# Patient Record
Sex: MALE
[De-identification: names, ages, dates, MRNs, and addresses within clinical notes are randomized per-mention and may not be internally consistent; named-entity substitution may affect disease eponyms.]

---

## 2019-02-25 ENCOUNTER — HOSPITAL ENCOUNTER (EMERGENCY)
Dept: HOSPITAL 5 - ED | Age: 39
Discharge: HOME | End: 2019-02-25
Payer: MEDICARE

## 2019-02-25 VITALS — SYSTOLIC BLOOD PRESSURE: 155 MMHG | DIASTOLIC BLOOD PRESSURE: 100 MMHG

## 2019-02-25 DIAGNOSIS — I50.9: ICD-10-CM

## 2019-02-25 DIAGNOSIS — I11.0: ICD-10-CM

## 2019-02-25 DIAGNOSIS — J20.8: Primary | ICD-10-CM

## 2019-02-25 DIAGNOSIS — R51: ICD-10-CM

## 2019-02-25 PROCEDURE — 96372 THER/PROPH/DIAG INJ SC/IM: CPT

## 2019-02-25 PROCEDURE — 99283 EMERGENCY DEPT VISIT LOW MDM: CPT

## 2019-02-25 PROCEDURE — 71046 X-RAY EXAM CHEST 2 VIEWS: CPT

## 2019-02-25 NOTE — XRAY REPORT
FINAL REPORT



EXAM:  XR CHEST ROUTINE 2V



HISTORY:  cough 



TECHNIQUE:  PA and lateral views of the chest



PRIORS:  None.



FINDINGS:  

Lines, tubes, and devices: N/A



Lungs and pleura: Trachea is normal in position.  Lungs are clear of infiltrate, pleural effusion, va
scular congestion, or pneumothorax. 



Cardiomediastinal silhouette: Cardiac and mediastinal silhouettes are unremarkable.



Other: Bony structures are intact.



IMPRESSION:  

No acute cardiopulmonary process seen.

## 2019-02-25 NOTE — EMERGENCY DEPARTMENT REPORT
Blank Doc





- Documentation


Documentation: 





This is a 38-year-old male that presents with URI symptoms.





This initial assessment diagnostic orders/clinical plan/treatment(s) is/are 

subject to change based on patient's health status, clinical progression and re-

assessment by fellow clinical providers in the ED.  Further treatment and workup

at subsequent clinical providers discretion.  Patient/guardians urged not to 

elope from ED s their condition may be serious if not clinically assessed and 

managed.  Initial orders include:


1-Patient sent to [facility] for further evaluation and treatment


2- CXR

## 2019-02-25 NOTE — EMERGENCY DEPARTMENT REPORT
Minor Respiratory





- HPI


Chief Complaint: Upper Respiratory Infection


Stated Complaint: POSS RESPIRATORY INFECTION


Time Seen by Provider: 19 14:11


Duration: 2 Days


Pain Location: Other (headache headache 2 days)


Severity: mild (2/10)


Minor Respiratory: Yes Rhinorrhea (nasal congestion that started over a week 

ago), Yes Able to Tolerate Fluids, Yes Cough (congested cough and chest), No 

Sore Throat, No Ear Pain, No Sick Contacts, No Hemoptysis, No Chest Pain, No 

Shortness of Breath, No Fever (report chills)


Other History: This is a 38-year-old male here report that he started having 

headache and feels congested in his chest with coughing and 2 days but he said 

prior to that he had nasal congestion and runny nose for over a week now.  His 

headache is 2/10 feels like pressure located to his forehead.  Over-the-counter 

cough and cold medication is not helping.  He did not take anything for pain.  

Patient have multiple comorbidities to include congestive heart failure, 

hypertension, renal disease and he is on dialysis.  Blood pressure is 155/100.  

He said he to take blood pressure medication but he had not taken them today.





ED Review of Systems


ROS: 


Stated complaint: POSS RESPIRATORY INFECTION


Other details as noted in HPI





Constitutional: chills.  denies: fever


ENT: congestion.  denies: throat pain


Respiratory: cough, wheezing.  denies: shortness of breath, SOB with exertion, 

SOB at rest, stridor


Cardiovascular: denies: chest pain, palpitations, dyspnea on exertion, edema, 

syncope


Gastrointestinal: denies: abdominal pain, nausea, vomiting


Musculoskeletal: denies: back pain, joint swelling, arthralgia, myalgia


Neurological: headache.  denies: numbness, paresthesias, abnormal gait, vertigo





ED Past Medical Hx





- Past Medical History


Previous Medical History?: Yes


Hx Hypertension: Yes (Took Lopressor @ 0530)


Hx Congestive Heart Failure: Yes (2014)


Hx Renal Disease: Yes (TUES; TH; SAT)


Hx Seizures: No


Hx HIV: No





- Surgical History


Past Surgical History?: Yes


Hx Pacemaker: No


Hx Internal Defibrillator: No


Additional Surgical History: PERMA CATH RIGHT CHEST.  RIGHT EYE SURGERY





- Family History


Family history: hypertension





- Social History


Smoking Status: Unknown if ever smoked


Substance Use Type: None





- Medications


Home Medications: 


                                Home Medications











 Medication  Instructions  Recorded  Confirmed  Last Taken  Type


 


Aspirin EC [Aspirin Enteric Coated 81 mg PO QDAY #30 tablet 01/09/15 02/02/15 

02/02/15 05:30 Rx





TAB]     


 


Famotidine [Pepcid] 20 mg PO DAILY #30 tablet 01/09/15 02/02/15 02/01/15 Rx


 


Folic Acid [Folvite] 1 mg PO QDAY #30 tablet 01/09/15 02/02/15 01/26/15 Rx


 


HYDROcodone/APAP 5-325 [Norco 1 each PO Q6H PRN #30 tablet 01/09/15 02/02/15 

01/26/15 Rx





5-325 mg TAB]     


 


ISOSORBIDE MONOnitrate [Imdur ER] 30 mg PO QDAY #30 tablet 01/09/15 02/02/15 

02/02/15 05:30 Rx


 


Metoprolol [Lopressor TAB] 50 mg PO Q8HR #90 tablet 01/09/15 02/02/15 02/02/15 

05:30 Rx


 


Sodium Bicarbonate 650 mg PO TID #90 tablet 01/09/15 02/02/15 01/19/15 Rx


 


Thiamine [Vitamin B-1] 100 mg PO QDAY #30 tablet 01/09/15 02/02/15 01/19/15 Rx


 


Zolpidem [Ambien] 5 mg PO QHS PRN #30 tablet 01/09/15 02/02/15 01/30/15 Rx


 


amLODIPine [Norvasc] 10 mg PO DAILY #30 tablet 01/09/15 02/02/15 02/02/15 05:30 

Rx


 


hydrALAZINE [Apresoline TAB] 100 mg PO Q8H #90 tablet 01/09/15 02/02/15 02/02/15

05:30 Rx


 


HYDROcodone/ACETAMINOPHEN [Norco 1 each PO Q6HR PRN #40 tablet 02/02/15  Unknown

 Rx





7.5-325 mg TAB]     


 


levoFLOXacin [Levaquin] 250 mg PO QDAY #7 tablet 02/11/15  Unknown Rx


 


oxyCODONE /ACETAMINOPHEN [Percocet 1 tab PO Q6HR PRN #14 tablet 02/11/15  

Unknown Rx





5/325 mg]     


 


predniSONE [Deltasone] 20 mg PO BID #8 tablet 02/11/15  Unknown Rx


 


ALBUTEROL Inhaler(NF) [VENTOLIN 2 puff IH Q6H PRN 1 Days #1 inha 19  

Unknown Rx





Inhaler(NF)]     


 


Cetirizine HCl [ZyrTEC] 10 mg PO QAM 14 Days #14 capsule 19  Unknown Rx


 


Doxycycline [Vibramycin CAP] 100 mg PO Q12HR 10 Days #20 capsule 19  

Unknown Rx


 


Fluticasone [Flonase] 1 spray NS QDAY 14 Days #1 bottle 19  Unknown Rx


 


guaiFENesin/CODEINE [Robitussin AC] 10 ml PO QHS PRN #70 oral.liqd 19  

Unknown Rx


 


predniSONE [Deltasone] 50 mg PO QDAY 3 Days #3 tab 19  Unknown Rx














Minor Respiratory Exam





- Exam


General: 


Vital signs noted. No distress. Alert and acting appropriately.


This is a 38-year-old male well-nourished well-developed in no acute distress 

patient is on dialysis 3 times a week.


HEENT: Yes Moist Mucous Membranes (uvula midline and oral airways patent), Yes 

Rhinorrhea (congested with erythema), No Pharyngeal Erythema, No Pharyngeal 

Exudates, No Conjuctival Injection, No Frontal Tenderness, No Maxillary 

Tenderness


Ear: Neither TM Bulge (bilateral TM congested without erythema), Neither TM 

Erythema, Neither EAC Pain, Neither EAC Discharge


Neck: Yes Supple (full range of motion), No Adenopathy


Lungs: Yes Good Air Exchange, Yes Wheezes (scattered wheezes into upper lung 

russ), Yes Ronchi (rhonchi cleared with cough and), Yes Cough (congested 

cough), No Stridor, No Labored Respirations, No Retractions, No Use of Accessory

 Muscles, No Other Abnormal Lung Sounds


Heart: Yes Regular (rate and rhythm)


Abdomen: Yes Normal Bowel Sounds, No Tenderness


Skin: No Rash, No Edema


Neurologic: 


Alert and oriented 3, no deficits.








Musculoskeletal: 


Unremarkable.





No cce. + 2 pulses in all extremities, no neurovascular compromise





ED Course


                                   Vital Signs











  19





  14:16


 


Temperature 97.7 F


 


Pulse Rate 78


 


Respiratory 18





Rate 


 


Blood Pressure 155/100


 


O2 Sat by Pulse 97





Oximetry 














- Reevaluation(s)


Reevaluation #1: 





19 19:55


Patient stable throughout ED course he receives DuoNeb 1 and emergency room.  

Lung sounds are clear.  He also received Decadron 10 mg IM.





ED Medical Decision Making





- Radiology Data


Radiology results: report reviewed


Chest x-ray dictated by radiologist report reviewed by myself.  Please see 

report below


Findings


[facility] 


[address] [place] [address] 





XRay Report 


Signed 





Patient: ADDIE REYES MR#: [id number] 


: 1980 Acct:[id number] 


Age/Sex: 38 / M ADM Date: 19 


Loc: ED 


Attending Dr: 








Ordering Physician: WEST LIVE NP 


Date of Service: 19 


Procedure(s): XR chest routine 2V 


Accession Number(s): [id number] 





cc: WEST LIVE NP 





Fluoro Time In Minutes: 





FINAL REPORT 





EXAM: XR CHEST ROUTINE 2V 





HISTORY: cough 





TECHNIQUE: PA and lateral views of the chest 





PRIORS: None. 





FINDINGS: 


Lines, tubes, and devices: N/A 





Lungs and pleura: Trachea is normal in position. Lungs are clear of infiltrate, 

pleural effusion, 


vascular congestion, or pneumothorax. 





Cardiomediastinal silhouette: Cardiac and mediastinal silhouettes are 

unremarkable. 





Other: Bony structures are intact. 





IMPRESSION: 


No acute cardiopulmonary process seen. 











Transcribed By: [facility] 


Dictated By: JAE SLADE MD 


Electronically Authenticated By: JAE SLADE MD 


Signed Date/Time: 19 











DD/DT: 19 155 


TD/TT: 19





- Medical Decision Making





This is a 38-year-old male presents to emergency room with complaint of nasal 

congestion draining 1 week and headache 2 days with chest congestion.  Patient

 has multiple medical problems including heart disease and also end-stage renal 

disease on dialysis.  He was given Decadron and 1 DuoNeb treatment in emergency 

room and his lungs are clear.  Patient does have a primary care side told to 

follow-up in 2-3 days and if his condition worsens or return to the emergency 

room.  Blood pressure is mildly elevated and he takes medication which she did 

not take today.  His other vital signs are stable and is afebrile.  Patient's 

chest x-ray with negative findings in the night discussed this with him.  I 

discussed discussed diagnosis of bronchitis and medication treatment plan.  

Patient will be placed on doxycycline as there is no renal dose and with this 

medication.  She will also be sent home on Zyrtec and Flonase and guaifenesin 

with codeine cough syrup to take at night.  He agrees with discharge and 

discharged home in stable condition





- Differential Diagnosis


PMH, bronchitis, pleurisy, viral versus bacterial, URI, sinusitis


Critical care attestation.: 


If time is entered above; I have spent that time in minutes in the direct care 

of this critically ill patient, excluding procedure time.








ED Disposition


Clinical Impression: 


 Cough in adult





Bronchitis, acute


Qualifiers:


 Bronchitis organism: other organism Qualified Code(s): J20.8 - Acute bronchitis

 due to other specified organisms





Headache


Qualifiers:


 Headache type: unspecified Headache chronicity pattern: acute headache 

Intractability: not intractable Qualified Code(s): R51 - Headache





Disposition: DC-01 TO HOME OR SELFCARE


Is pt being admited?: No


Does the pt Need Aspirin: No


Condition: Stable


Instructions:  Acute Bronchitis (ED), Acute Cough (ED), Upper Respiratory 

Infection (ED)


Additional Instructions: 


Please follow up with primary care doctor in 2-3 days


If his symptoms worsen, return to emergency room


Take guaifenesin with codeine or codeine at night and please not drive or 

operate heavy machinery while taking this medication can cause drowsiness


Take all of the medication as prescribed


Please take your blood pressure medication as it was mildly elevated today.


Please let kidney doctor know that you are started on medication for bronchitis 

in emergency room.


Referrals: 


BRIDGETTE HERNANDEZ [Primary Care Provider] - 2-3 Days


Forms:  Work/School Release Form(ED)

## 2023-01-03 ENCOUNTER — WEB ENCOUNTER (OUTPATIENT)
Dept: URBAN - METROPOLITAN AREA CLINIC 113 | Facility: CLINIC | Age: 43
End: 2023-01-03

## 2023-01-09 ENCOUNTER — OFFICE VISIT (OUTPATIENT)
Dept: URBAN - METROPOLITAN AREA CLINIC 107 | Facility: CLINIC | Age: 43
End: 2023-01-09

## 2023-01-10 ENCOUNTER — OFFICE VISIT (OUTPATIENT)
Dept: URBAN - METROPOLITAN AREA CLINIC 113 | Facility: CLINIC | Age: 43
End: 2023-01-10

## 2023-01-12 ENCOUNTER — CLAIMS CREATED FROM THE CLAIM WINDOW (OUTPATIENT)
Dept: URBAN - METROPOLITAN AREA CLINIC 107 | Facility: CLINIC | Age: 43
End: 2023-01-12
Payer: MEDICARE

## 2023-01-12 ENCOUNTER — OFFICE VISIT (OUTPATIENT)
Dept: URBAN - METROPOLITAN AREA CLINIC 107 | Facility: CLINIC | Age: 43
End: 2023-01-12

## 2023-01-12 ENCOUNTER — LAB OUTSIDE AN ENCOUNTER (OUTPATIENT)
Dept: URBAN - METROPOLITAN AREA CLINIC 107 | Facility: CLINIC | Age: 43
End: 2023-01-12

## 2023-01-12 ENCOUNTER — LAB OUTSIDE AN ENCOUNTER (OUTPATIENT)
Dept: URBAN - METROPOLITAN AREA CLINIC 113 | Facility: CLINIC | Age: 43
End: 2023-01-12

## 2023-01-12 ENCOUNTER — CLAIMS CREATED FROM THE CLAIM WINDOW (OUTPATIENT)
Dept: URBAN - METROPOLITAN AREA CLINIC 107 | Facility: CLINIC | Age: 43
End: 2023-01-12

## 2023-01-12 VITALS
DIASTOLIC BLOOD PRESSURE: 72 MMHG | TEMPERATURE: 97.6 F | SYSTOLIC BLOOD PRESSURE: 110 MMHG | BODY MASS INDEX: 23.87 KG/M2 | WEIGHT: 164 LBS | HEART RATE: 74 BPM | RESPIRATION RATE: 18 BRPM

## 2023-01-12 DIAGNOSIS — R63.4 WEIGHT LOSS: ICD-10-CM

## 2023-01-12 DIAGNOSIS — R19.7 DIARRHEA, UNSPECIFIED TYPE: ICD-10-CM

## 2023-01-12 PROCEDURE — 99204 OFFICE O/P NEW MOD 45 MIN: CPT

## 2023-01-12 RX ORDER — CARVEDILOL 25 MG/1
1 TABLET WITH FOOD TABLET, FILM COATED ORAL TWICE A DAY
Status: ACTIVE | COMMUNITY

## 2023-01-12 RX ORDER — SERTRALINE HYDROCHLORIDE 50 MG/1
1 TABLET TABLET, FILM COATED ORAL ONCE A DAY
Status: ACTIVE | COMMUNITY

## 2023-01-12 RX ORDER — PREDNISONE 5 MG/1
1 TABLET TABLET ORAL ONCE A DAY
Status: ACTIVE | COMMUNITY

## 2023-01-12 RX ORDER — SODIUM, POTASSIUM,MAG SULFATES 17.5-3.13G
177ML SOLUTION, RECONSTITUTED, ORAL ORAL
Qty: 1 | OUTPATIENT
Start: 2023-01-12 | End: 2023-01-14

## 2023-01-12 RX ORDER — MYCOPHENOLATE MOFETIL 500 MG/1
2 TABLETS TABLET ORAL TWICE A DAY
Status: ACTIVE | COMMUNITY

## 2023-01-12 RX ORDER — CALCIUM CARBONATE 300MG(750)
AS DIRECTED TABLET,CHEWABLE ORAL
Status: ACTIVE | COMMUNITY

## 2023-01-12 NOTE — HPI-TODAY'S VISIT:
Mr. Felton is a 42-year-old gentleman presenting for hospital follow-up and evaluation of loose watery stools and unintentional weight loss. 11/12/2022 seen at [facility] with complaint of weight loss and diarrhea.  He had been experiencing diarrhea for 2 months.  Stool studies were positive for Campylobacter and norovirus.  He was given a 7-day course of Cipro. Today he presents for evaluation of frequent watery stools.  He is unsure of how long this has been going on.  He reports 3 loose watery bowel movements per day with no blood, but with mucus.  The 7-day course of Cipro helped his symptoms, but he is still experiencing the watery stool.  He has never had a colonoscopy.  He reports a 40 to 50 pound weight loss over an unknown amount of time.  He denies dysphagia, reflux, nausea, vomiting, abdominal pain.  He does not have any known family history of colon cancer.  No pacemaker.  No blood thinners.

## 2023-01-13 ENCOUNTER — LAB OUTSIDE AN ENCOUNTER (OUTPATIENT)
Dept: URBAN - METROPOLITAN AREA CLINIC 113 | Facility: CLINIC | Age: 43
End: 2023-01-13

## 2023-01-15 LAB
A/G RATIO: 2.8
ALBUMIN: 4.5
ALKALINE PHOSPHATASE: 181
ALT (SGPT): 17
AMBIG ABBREV CMP14 DEFAULT: (no result)
AST (SGOT): 15
BASO (ABSOLUTE): 0
BASOS: 0
BILIRUBIN, TOTAL: 0.4
BUN/CREATININE RATIO: 9
BUN: 10
CALCIUM: 8.9
CARBON DIOXIDE, TOTAL: 16
CHLORIDE: 108
CREATININE: 1.15
EGFR: 80
EOS (ABSOLUTE): 0.2
EOS: 4
GLOBULIN, TOTAL: 1.6
GLUCOSE: 99
HEMATOCRIT: 45.4
HEMATOLOGY COMMENTS:: (no result)
HEMOGLOBIN: 14.5
IMMATURE CELLS: (no result)
IMMATURE GRANS (ABS): 0
IMMATURE GRANULOCYTES: 0
LYMPHS (ABSOLUTE): 0.6
LYMPHS: 10
MCH: 29.5
MCHC: 31.9
MCV: 93
MONOCYTES(ABSOLUTE): 0.6
MONOCYTES: 10
NEUTROPHILS (ABSOLUTE): 4.4
NEUTROPHILS: 76
NRBC: (no result)
PLATELETS: 196
POTASSIUM: 3.7
PROTEIN, TOTAL: 6.1
RBC: 4.91
RDW: 12
SODIUM: 140
WBC: 5.8

## 2023-01-18 ENCOUNTER — TELEPHONE ENCOUNTER (OUTPATIENT)
Dept: URBAN - METROPOLITAN AREA CLINIC 113 | Facility: CLINIC | Age: 43
End: 2023-01-18

## 2023-01-18 LAB
ADENOVIRUS F 40/41: NOT DETECTED
ASTROVIRUS: NOT DETECTED
C DIFFICILE TOXIN A/B: NOT DETECTED
C DIFFICILE TOXINS A+B, EIA: NEGATIVE
CALPROTECTIN, FECAL: 43
CAMPYLOBACTER: NOT DETECTED
CRYPTOSPORIDIUM: NOT DETECTED
CYCLOSPORA CAYETANENSIS: NOT DETECTED
E COLI O157: (no result)
ENTAMOEBA HISTOLYTICA: NOT DETECTED
ENTEROAGGREGATIVE E COLI: NOT DETECTED
ENTEROPATHOGENIC E COLI: NOT DETECTED
ENTEROTOXIGENIC E COLI: NOT DETECTED
GIARDIA LAMBLIA: NOT DETECTED
NOROVIRUS GI/GII: DETECTED
PLESIOMONAS SHIGELLOIDES: NOT DETECTED
ROTAVIRUS A: NOT DETECTED
SALMONELLA: NOT DETECTED
SAPOVIRUS: NOT DETECTED
SHIGA-TOXIN-PRODUCING E COLI: NOT DETECTED
SHIGELLA/ENTEROINVASIVE E COLI: NOT DETECTED
VIBRIO CHOLERAE: NOT DETECTED
VIBRIO: NOT DETECTED
YERSINIA ENTEROCOLITICA: NOT DETECTED

## 2023-01-24 ENCOUNTER — TELEPHONE ENCOUNTER (OUTPATIENT)
Dept: URBAN - METROPOLITAN AREA CLINIC 113 | Facility: CLINIC | Age: 43
End: 2023-01-24

## 2023-01-28 ENCOUNTER — WEB ENCOUNTER (OUTPATIENT)
Dept: URBAN - METROPOLITAN AREA SURGERY CENTER 25 | Facility: SURGERY CENTER | Age: 43
End: 2023-01-28

## 2023-01-31 ENCOUNTER — TELEPHONE ENCOUNTER (OUTPATIENT)
Dept: URBAN - METROPOLITAN AREA CLINIC 113 | Facility: CLINIC | Age: 43
End: 2023-01-31

## 2023-01-31 ENCOUNTER — LAB OUTSIDE AN ENCOUNTER (OUTPATIENT)
Dept: URBAN - METROPOLITAN AREA CLINIC 113 | Facility: CLINIC | Age: 43
End: 2023-01-31

## 2023-02-01 ENCOUNTER — CLAIMS CREATED FROM THE CLAIM WINDOW (OUTPATIENT)
Dept: URBAN - METROPOLITAN AREA CLINIC 4 | Facility: CLINIC | Age: 43
End: 2023-02-01
Payer: MEDICARE

## 2023-02-01 ENCOUNTER — OFFICE VISIT (OUTPATIENT)
Dept: URBAN - METROPOLITAN AREA SURGERY CENTER 25 | Facility: SURGERY CENTER | Age: 43
End: 2023-02-01
Payer: MEDICARE

## 2023-02-01 DIAGNOSIS — R19.7 CHRONIC DIARRHEA: ICD-10-CM

## 2023-02-01 DIAGNOSIS — K63.89 OTHER SPECIFIED DISEASES OF INTESTINE: ICD-10-CM

## 2023-02-01 PROCEDURE — 88313 SPECIAL STAINS GROUP 2: CPT | Performed by: PATHOLOGY

## 2023-02-01 PROCEDURE — 45380 COLONOSCOPY AND BIOPSY: CPT | Performed by: INTERNAL MEDICINE

## 2023-02-01 PROCEDURE — 88305 TISSUE EXAM BY PATHOLOGIST: CPT | Performed by: PATHOLOGY

## 2023-02-01 PROCEDURE — 88342 IMHCHEM/IMCYTCHM 1ST ANTB: CPT | Performed by: PATHOLOGY

## 2023-02-01 PROCEDURE — G8907 PT DOC NO EVENTS ON DISCHARG: HCPCS | Performed by: INTERNAL MEDICINE

## 2023-02-01 RX ORDER — CARVEDILOL 25 MG/1
1 TABLET WITH FOOD TABLET, FILM COATED ORAL TWICE A DAY
Status: ACTIVE | COMMUNITY

## 2023-02-01 RX ORDER — CALCIUM CARBONATE 300MG(750)
AS DIRECTED TABLET,CHEWABLE ORAL
Status: ACTIVE | COMMUNITY

## 2023-02-01 RX ORDER — MYCOPHENOLATE MOFETIL 500 MG/1
2 TABLETS TABLET ORAL TWICE A DAY
Status: ACTIVE | COMMUNITY

## 2023-02-01 RX ORDER — SERTRALINE HYDROCHLORIDE 50 MG/1
1 TABLET TABLET, FILM COATED ORAL ONCE A DAY
Status: ACTIVE | COMMUNITY

## 2023-02-01 RX ORDER — PREDNISONE 5 MG/1
1 TABLET TABLET ORAL ONCE A DAY
Status: ACTIVE | COMMUNITY

## 2023-03-03 ENCOUNTER — OFFICE VISIT (OUTPATIENT)
Dept: URBAN - METROPOLITAN AREA CLINIC 107 | Facility: CLINIC | Age: 43
End: 2023-03-03

## 2023-03-03 ENCOUNTER — WEB ENCOUNTER (OUTPATIENT)
Dept: URBAN - METROPOLITAN AREA CLINIC 107 | Facility: CLINIC | Age: 43
End: 2023-03-03

## 2023-03-03 ENCOUNTER — OFFICE VISIT (OUTPATIENT)
Dept: URBAN - METROPOLITAN AREA CLINIC 107 | Facility: CLINIC | Age: 43
End: 2023-03-03
Payer: MEDICARE

## 2023-03-03 VITALS
HEIGHT: 70 IN | HEART RATE: 66 BPM | WEIGHT: 159 LBS | TEMPERATURE: 96.8 F | BODY MASS INDEX: 22.76 KG/M2 | SYSTOLIC BLOOD PRESSURE: 104 MMHG | DIASTOLIC BLOOD PRESSURE: 66 MMHG

## 2023-03-03 DIAGNOSIS — R19.7 DIARRHEA, UNSPECIFIED TYPE: ICD-10-CM

## 2023-03-03 DIAGNOSIS — R63.4 WEIGHT LOSS: ICD-10-CM

## 2023-03-03 DIAGNOSIS — R74.8 ELEVATED ALKALINE PHOSPHATASE LEVEL: ICD-10-CM

## 2023-03-03 PROCEDURE — 99214 OFFICE O/P EST MOD 30 MIN: CPT

## 2023-03-03 RX ORDER — COLESTIPOL HYDROCHLORIDE 1 G/1
1 TABLET TABLET, FILM COATED ORAL TWICE DAILY
Qty: 180 | Refills: 2 | OUTPATIENT
Start: 2023-03-03

## 2023-03-03 RX ORDER — MYCOPHENOLATE MOFETIL 500 MG/1
2 TABLETS TABLET ORAL TWICE A DAY
Status: ACTIVE | COMMUNITY

## 2023-03-03 RX ORDER — SERTRALINE HYDROCHLORIDE 50 MG/1
1 TABLET TABLET, FILM COATED ORAL ONCE A DAY
Status: ACTIVE | COMMUNITY

## 2023-03-03 RX ORDER — PREDNISONE 5 MG/1
1 TABLET TABLET ORAL ONCE A DAY
Status: ACTIVE | COMMUNITY

## 2023-03-03 RX ORDER — CALCIUM CARBONATE 300MG(750)
AS DIRECTED TABLET,CHEWABLE ORAL
Status: ACTIVE | COMMUNITY

## 2023-03-03 RX ORDER — CARVEDILOL 25 MG/1
1 TABLET WITH FOOD TABLET, FILM COATED ORAL TWICE A DAY
Status: ACTIVE | COMMUNITY

## 2023-03-03 NOTE — HPI-TODAY'S VISIT:
42-year-old male presents for follow-up after colonoscopy.  He was last seen on 1/12/2023.  He had been treated for Campylobacter and with a 7-day course of Cipro.  He also tested positive for norovirus.  Bowel movements have improved but continued with watery stools 3 times per day.  He is planned for repeat stool studies and diagnostic colonoscopy.  He did report weight loss of 40 to 50 pounds over an unknown amount of time.  He was planned for CT scan of the abdomen/pelvis to rule out intra-abdominal pathology.  Labs/stool studies 1/13/2023:Negative fecal calprotectin, negative C. difficile toxins a and B, positive norovirus, alkaline phosphatase 181, normal AST, normal ALT, normal total bilirubin, unremarkable CBC.  Ultrasound of abdomen 2/9/2023: Gallbladder polyp measuring up to 0.5 cm. Hepatomegaly, liver measuring up to 17.8 cm.   He was informed of positive norovirus and recommended supportive care until his colonoscopy.  Colonoscopy 2/1/2023:Performed without difficulty.  BPS score of 9 using Suprep.  TI was normal.  Nonbleeding grade 2 internal hemorrhoids.  Normal mucosa found throughout the colon status post biopsies from right colon and left colon to rule out microscopic colitis.  Localized area of mildly nodular mucosa found in the rectum and biopsied.  Pathology revealed no abnormality.  Repeat colonoscopy in 10 years for screening.   His diarrhea persists. He does take Imodium as needed. He denies abdominal pain, nausea or vomiting. He had a kidney transplant 2 years ago in March. He gained weight after this from steroid use. he has been trying to lose weight over the past 5-6 months. He stopped dieting and now has unintentional weight loss. He has lost an additional 5 pounds.   1/12/2023 Mr. Felton is a 42-year-old gentleman presenting for hospital follow-up and evaluation of loose watery stools and unintentional weight loss. 11/12/2022 seen at [facility] with complaint of weight loss and diarrhea.  He had been experiencing diarrhea for 2 months.  Stool studies were positive for Campylobacter and norovirus.  He was given a 7-day course of Cipro. Today he presents for evaluation of frequent watery stools.  He is unsure of how long this has been going on.  He reports 3 loose watery bowel movements per day with no blood, but with mucus.  The 7-day course of Cipro helped his symptoms, but he is still experiencing the watery stool.  He has never had a colonoscopy.  He reports a 40 to 50 pound weight loss over an unknown amount of time.  He denies dysphagia, reflux, nausea, vomiting, abdominal pain.  He does not have any known family history of colon cancer.  No pacemaker.  No blood thinners.

## 2023-03-07 LAB
ALBUMIN/GLOBULIN RATIO: 2.7
ALBUMIN: 4
ALKALINE PHOSPHATASE: 175
ALKALINE PHOSPHATASE: 183
ALT (SGPT): 17
AST (SGOT): 17
BILIRUBIN, DIRECT: 0.2
BILIRUBIN, INDIRECT: 0.4
BILIRUBIN, TOTAL: 0.6
BONE ISOENZYMES: 62
GGT: 8
GLOBULIN: 1.5
IMMUNOGLOBULIN A, QN, SERUM: 127
INTERPRETATION: (no result)
INTESTINAL ISOENZYMES: 7
LIVER ISOENZYMES: 31
MACROHEPATIC ISOENZYMES: 0
MITOCHONDRIAL (M2) ANTIBODY: <=20
PLACENTAL ISOENZYMES: 0
PROTEIN, TOTAL: 5.5
T-TRANSGLUTAMINASE (TTG) IGA: <1

## 2023-03-09 ENCOUNTER — TELEPHONE ENCOUNTER (OUTPATIENT)
Dept: URBAN - METROPOLITAN AREA CLINIC 113 | Facility: CLINIC | Age: 43
End: 2023-03-09

## 2023-03-14 LAB
FECAL FAT, QUALITATIVE: NORMAL
PANCREATIC ELASTASE, FECAL: >500

## 2023-05-05 ENCOUNTER — OFFICE VISIT (OUTPATIENT)
Dept: URBAN - METROPOLITAN AREA CLINIC 107 | Facility: CLINIC | Age: 43
End: 2023-05-05
Payer: MEDICARE

## 2023-05-05 VITALS
DIASTOLIC BLOOD PRESSURE: 61 MMHG | HEART RATE: 68 BPM | RESPIRATION RATE: 18 BRPM | HEIGHT: 70 IN | BODY MASS INDEX: 21.76 KG/M2 | SYSTOLIC BLOOD PRESSURE: 97 MMHG | TEMPERATURE: 96.8 F | WEIGHT: 152 LBS

## 2023-05-05 DIAGNOSIS — K82.4 GALLBLADDER POLYP: ICD-10-CM

## 2023-05-05 DIAGNOSIS — R19.7 DIARRHEA, UNSPECIFIED TYPE: ICD-10-CM

## 2023-05-05 DIAGNOSIS — R74.8 ELEVATED ALKALINE PHOSPHATASE LEVEL: ICD-10-CM

## 2023-05-05 DIAGNOSIS — R63.4 WEIGHT LOSS: ICD-10-CM

## 2023-05-05 PROCEDURE — 99214 OFFICE O/P EST MOD 30 MIN: CPT

## 2023-05-05 RX ORDER — CARVEDILOL 25 MG/1
1 TABLET WITH FOOD TABLET, FILM COATED ORAL TWICE A DAY
Status: ACTIVE | COMMUNITY

## 2023-05-05 RX ORDER — COLESTIPOL HYDROCHLORIDE 1 G/1
1 TABLET TABLET, FILM COATED ORAL TWICE DAILY
Qty: 180 | Refills: 2 | Status: ACTIVE | COMMUNITY
Start: 2023-03-03

## 2023-05-05 RX ORDER — CALCIUM CARBONATE 300MG(750)
AS DIRECTED TABLET,CHEWABLE ORAL
Status: ACTIVE | COMMUNITY

## 2023-05-05 RX ORDER — SERTRALINE HYDROCHLORIDE 50 MG/1
1 TABLET TABLET, FILM COATED ORAL ONCE A DAY
Status: ACTIVE | COMMUNITY

## 2023-05-05 RX ORDER — MYCOPHENOLATE MOFETIL 500 MG/1
2 TABLETS TABLET ORAL TWICE A DAY
Status: ACTIVE | COMMUNITY

## 2023-05-05 RX ORDER — COLESTIPOL HYDROCHLORIDE 1 G/1
1 TABLET TABLET, FILM COATED ORAL TWICE DAILY
Qty: 180 | Refills: 2 | OUTPATIENT

## 2023-05-05 RX ORDER — PREDNISONE 5 MG/1
1 TABLET TABLET ORAL ONCE A DAY
Status: ACTIVE | COMMUNITY

## 2023-05-05 NOTE — HPI-TODAY'S VISIT:
42-year-old male presents for short interval follow-up.  He was last seen on 3/3/2023.  The cause of his diarrhea remains questionable.  He was planned for labs to rule out celiac disease, thyroid dysfunction and pancreatic insufficiency.  He was started on a bile acid binder in the interim.  Abdominal ultrasound revealed gallbladder polyp and hepatomegaly otherwise unremarkable for cause of weight loss.  He was then planned for pan CT to rule out malignancy.  Regarding his elevated ALP, he was planned for isoenzymes GGT and AMA. Labs 3/6/2023:Normal fecal fat, negative GGT, negative AMA, normal pancreatic elastase, negative celiac disease panel, alkaline phosphatase 183, normal AST, normal ALT, normal total bilirubin, normal bone isoenzyme, normal liver isoenzyme, normal intestinal isoenzyme. CT scan of chest/abdomen/pelvis with contrast 3/21/2023:Scattered punctate calcified pulmonary micronodules bilaterally, largest located in the posterior aspect of the right lower lobe measuring 0.3 cm.  No suspicious pulmonary nodules.  Liver, gallbladder, pancreas, spleen are unremarkable.  Severe atrophy of the native kidneys with small multicystic changes.  Right pelvic kidney transplant which demonstrates normal size and enhancement characteristics.  Stomach is unremarkable.  Moderate burden of stool throughout the colon. He was recommended to follow-up with his PCP or nephrologist for the above findings.  Report was sent to his nephrologist, Dr. Inés More.  Labs 4/10/2023:Unremarkable CBC, normal AST, normal ALT, elevated , normal vitamin D.   He was not able to get his bile acid binder until a few weeks ago. He does feel this is effective. He is titrating the dose. His appetite is healthy and he is eating more however he has lost an additional 7 pounds. He is scheduled to see his PCP and nephrologist this month.  3/3/2023 42-year-old male presents for follow-up after colonoscopy.  He was last seen on 1/12/2023.  He had been treated for Campylobacter and with a 7-day course of Cipro.  He also tested positive for norovirus.  Bowel movements have improved but continued with watery stools 3 times per day.  He is planned for repeat stool studies and diagnostic colonoscopy.  He did report weight loss of 40 to 50 pounds over an unknown amount of time.  He was planned for CT scan of the abdomen/pelvis to rule out intra-abdominal pathology.  Labs/stool studies 1/13/2023:Negative fecal calprotectin, negative C. difficile toxins a and B, positive norovirus, alkaline phosphatase 181, normal AST, normal ALT, normal total bilirubin, unremarkable CBC.  Ultrasound of abdomen 2/9/2023: Gallbladder polyp measuring up to 0.5 cm. Hepatomegaly, liver measuring up to 17.8 cm.   He was informed of positive norovirus and recommended supportive care until his colonoscopy.  Colonoscopy 2/1/2023:Performed without difficulty.  BPS score of 9 using Suprep.  TI was normal.  Nonbleeding grade 2 internal hemorrhoids.  Normal mucosa found throughout the colon status post biopsies from right colon and left colon to rule out microscopic colitis.  Localized area of mildly nodular mucosa found in the rectum and biopsied.  Pathology revealed no abnormality.  Repeat colonoscopy in 10 years for screening.   His diarrhea persists. He does take Imodium as needed. He denies abdominal pain, nausea or vomiting. He had a kidney transplant 2 years ago in March. He gained weight after this from steroid use. he has been trying to lose weight over the past 5-6 months. He stopped dieting and now has unintentional weight loss. He has lost an additional 5 pounds.   1/12/2023 Mr. Felton is a 42-year-old gentleman presenting for hospital follow-up and evaluation of loose watery stools and unintentional weight loss. 11/12/2022 seen at [facility] with complaint of weight loss and diarrhea.  He had been experiencing diarrhea for 2 months.  Stool studies were positive for Campylobacter and norovirus.  He was given a 7-day course of Cipro. Today he presents for evaluation of frequent watery stools.  He is unsure of how long this has been going on.  He reports 3 loose watery bowel movements per day with no blood, but with mucus.  The 7-day course of Cipro helped his symptoms, but he is still experiencing the watery stool.  He has never had a colonoscopy.  He reports a 40 to 50 pound weight loss over an unknown amount of time.  He denies dysphagia, reflux, nausea, vomiting, abdominal pain.  He does not have any known family history of colon cancer.  No pacemaker.  No blood thinners.

## 2023-07-21 ENCOUNTER — OFFICE VISIT (OUTPATIENT)
Dept: URBAN - METROPOLITAN AREA CLINIC 107 | Facility: CLINIC | Age: 43
End: 2023-07-21
Payer: MEDICARE

## 2023-07-21 ENCOUNTER — WEB ENCOUNTER (OUTPATIENT)
Dept: URBAN - METROPOLITAN AREA CLINIC 107 | Facility: CLINIC | Age: 43
End: 2023-07-21

## 2023-07-21 ENCOUNTER — DASHBOARD ENCOUNTERS (OUTPATIENT)
Age: 43
End: 2023-07-21

## 2023-07-21 VITALS
SYSTOLIC BLOOD PRESSURE: 93 MMHG | WEIGHT: 148 LBS | HEIGHT: 70 IN | BODY MASS INDEX: 21.19 KG/M2 | HEART RATE: 71 BPM | RESPIRATION RATE: 18 BRPM | TEMPERATURE: 96.6 F

## 2023-07-21 DIAGNOSIS — R74.8 ELEVATED ALKALINE PHOSPHATASE LEVEL: ICD-10-CM

## 2023-07-21 DIAGNOSIS — R63.4 WEIGHT LOSS: ICD-10-CM

## 2023-07-21 DIAGNOSIS — R19.7 DIARRHEA, UNSPECIFIED TYPE: ICD-10-CM

## 2023-07-21 DIAGNOSIS — K82.4 GALLBLADDER POLYP: ICD-10-CM

## 2023-07-21 PROCEDURE — 99214 OFFICE O/P EST MOD 30 MIN: CPT

## 2023-07-21 RX ORDER — CARVEDILOL 25 MG/1
1 TABLET WITH FOOD TABLET, FILM COATED ORAL TWICE A DAY
Status: ACTIVE | COMMUNITY

## 2023-07-21 RX ORDER — COLESTIPOL HYDROCHLORIDE 1 G/1
1 TABLET TABLET, FILM COATED ORAL TWICE DAILY
Qty: 180 | Refills: 2 | Status: ACTIVE | COMMUNITY

## 2023-07-21 RX ORDER — CALCIUM CARBONATE 300MG(750)
AS DIRECTED TABLET,CHEWABLE ORAL
Status: ACTIVE | COMMUNITY

## 2023-07-21 RX ORDER — PREDNISONE 5 MG/1
1 TABLET TABLET ORAL ONCE A DAY
Status: ACTIVE | COMMUNITY

## 2023-07-21 RX ORDER — MYCOPHENOLATE MOFETIL 500 MG/1
2 TABLETS TABLET ORAL TWICE A DAY
Status: ACTIVE | COMMUNITY

## 2023-07-21 RX ORDER — COLESTIPOL HYDROCHLORIDE 1 G/1
1 TABLET TABLET, FILM COATED ORAL TWICE DAILY
Qty: 180 | Refills: 2

## 2023-07-21 RX ORDER — SERTRALINE HYDROCHLORIDE 50 MG/1
1 TABLET TABLET, FILM COATED ORAL ONCE A DAY
Status: ACTIVE | COMMUNITY

## 2023-07-21 NOTE — HPI-TODAY'S VISIT:
42-year-old male presents for follow-up.  He was last seen on 5/5/2023.  He was to continue titrating colestipol.  Regarding his gallbladder polyp he was to undergo ultrasound annually for monitoring.  Regarding his elevated ALP, liver etiology has been ruled out.  He was to follow-up with Dr. More with consideration of endocrinology referral if needed.   He recently saw his nephologist. There was concern that his immunosuppressants are causing his weight loss. His transplant specialist at [facility] has decreased his CellCept. He has begun taking calcium and vitamin D as well. He feels this has helped bulk his stools. He is tweaking his colestipol medication. He has lost an additional 4 pounds since his last visit. He has no other GI complaints at this time.  5/5/2023 42-year-old male presents for short interval follow-up.  He was last seen on 3/3/2023.  The cause of his diarrhea remains questionable.  He was planned for labs to rule out celiac disease, thyroid dysfunction and pancreatic insufficiency.  He was started on a bile acid binder in the interim.  Abdominal ultrasound revealed gallbladder polyp and hepatomegaly otherwise unremarkable for cause of weight loss.  He was then planned for pan CT to rule out malignancy.  Regarding his elevated ALP, he was planned for isoenzymes GGT and AMA. Labs 3/6/2023:Normal fecal fat, negative GGT, negative AMA, normal pancreatic elastase, negative celiac disease panel, alkaline phosphatase 183, normal AST, normal ALT, normal total bilirubin, normal bone isoenzyme, normal liver isoenzyme, normal intestinal isoenzyme. CT scan of chest/abdomen/pelvis with contrast 3/21/2023:Scattered punctate calcified pulmonary micronodules bilaterally, largest located in the posterior aspect of the right lower lobe measuring 0.3 cm.  No suspicious pulmonary nodules.  Liver, gallbladder, pancreas, spleen are unremarkable.  Severe atrophy of the native kidneys with small multicystic changes.  Right pelvic kidney transplant which demonstrates normal size and enhancement characteristics.  Stomach is unremarkable.  Moderate burden of stool throughout the colon. He was recommended to follow-up with his PCP or nephrologist for the above findings.  Report was sent to his nephrologist, Dr. Inés Moer.  Labs 4/10/2023:Unremarkable CBC, normal AST, normal ALT, elevated , normal vitamin D.   He was not able to get his bile acid binder until a few weeks ago. He does feel this is effective. He is titrating the dose. His appetite is healthy and he is eating more however he has lost an additional 7 pounds. He is scheduled to see his PCP and nephrologist this month.  3/3/2023 42-year-old male presents for follow-up after colonoscopy.  He was last seen on 1/12/2023.  He had been treated for Campylobacter and with a 7-day course of Cipro.  He also tested positive for norovirus.  Bowel movements have improved but continued with watery stools 3 times per day.  He is planned for repeat stool studies and diagnostic colonoscopy.  He did report weight loss of 40 to 50 pounds over an unknown amount of time.  He was planned for CT scan of the abdomen/pelvis to rule out intra-abdominal pathology.  Labs/stool studies 1/13/2023:Negative fecal calprotectin, negative C. difficile toxins a and B, positive norovirus, alkaline phosphatase 181, normal AST, normal ALT, normal total bilirubin, unremarkable CBC.  Ultrasound of abdomen 2/9/2023: Gallbladder polyp measuring up to 0.5 cm. Hepatomegaly, liver measuring up to 17.8 cm.   He was informed of positive norovirus and recommended supportive care until his colonoscopy.  Colonoscopy 2/1/2023:Performed without difficulty.  BPS score of 9 using Suprep.  TI was normal.  Nonbleeding grade 2 internal hemorrhoids.  Normal mucosa found throughout the colon status post biopsies from right colon and left colon to rule out microscopic colitis.  Localized area of mildly nodular mucosa found in the rectum and biopsied.  Pathology revealed no abnormality.  Repeat colonoscopy in 10 years for screening.   His diarrhea persists. He does take Imodium as needed. He denies abdominal pain, nausea or vomiting. He had a kidney transplant 2 years ago in March. He gained weight after this from steroid use. he has been trying to lose weight over the past 5-6 months. He stopped dieting and now has unintentional weight loss. He has lost an additional 5 pounds.   1/12/2023 Mr. Felton is a 42-year-old gentleman presenting for hospital follow-up and evaluation of loose watery stools and unintentional weight loss. 11/12/2022 seen at [facility] with complaint of weight loss and diarrhea.  He had been experiencing diarrhea for 2 months.  Stool studies were positive for Campylobacter and norovirus.  He was given a 7-day course of Cipro. Today he presents for evaluation of frequent watery stools.  He is unsure of how long this has been going on.  He reports 3 loose watery bowel movements per day with no blood, but with mucus.  The 7-day course of Cipro helped his symptoms, but he is still experiencing the watery stool.  He has never had a colonoscopy.  He reports a 40 to 50 pound weight loss over an unknown amount of time.  He denies dysphagia, reflux, nausea, vomiting, abdominal pain.  He does not have any known family history of colon cancer.  No pacemaker.  No blood thinners.

## 2024-02-01 ENCOUNTER — LAB (OUTPATIENT)
Dept: URBAN - METROPOLITAN AREA CLINIC 107 | Facility: CLINIC | Age: 44
End: 2024-02-01